# Patient Record
Sex: FEMALE | Race: WHITE | Employment: FULL TIME | URBAN - NONMETROPOLITAN AREA
[De-identification: names, ages, dates, MRNs, and addresses within clinical notes are randomized per-mention and may not be internally consistent; named-entity substitution may affect disease eponyms.]

---

## 2021-05-05 ENCOUNTER — TELEPHONE (OUTPATIENT)
Dept: OBGYN CLINIC | Age: 46
End: 2021-05-05

## 2021-05-05 NOTE — TELEPHONE ENCOUNTER
Pt called for refill on RX- sched annual on 8/18 wanted only Dr Rolo Kruse. Could we send refills for her until that date please.

## 2021-05-06 RX ORDER — NORGESTIMATE AND ETHINYL ESTRADIOL 0.25-0.035
1 KIT ORAL DAILY
Qty: 1 PACKET | Refills: 2 | Status: SHIPPED | OUTPATIENT
Start: 2021-05-06 | End: 2021-08-25

## 2021-08-24 ENCOUNTER — TELEPHONE (OUTPATIENT)
Dept: OBGYN CLINIC | Age: 46
End: 2021-08-24

## 2021-08-25 RX ORDER — NORGESTIMATE AND ETHINYL ESTRADIOL 0.25-0.035
1 KIT ORAL DAILY
Qty: 1 PACKET | Refills: 0 | Status: SHIPPED | OUTPATIENT
Start: 2021-08-25 | End: 2021-09-21

## 2021-09-16 ENCOUNTER — OFFICE VISIT (OUTPATIENT)
Dept: OBGYN CLINIC | Age: 46
End: 2021-09-16
Payer: COMMERCIAL

## 2021-09-16 VITALS
BODY MASS INDEX: 28.66 KG/M2 | DIASTOLIC BLOOD PRESSURE: 73 MMHG | WEIGHT: 172 LBS | HEART RATE: 68 BPM | SYSTOLIC BLOOD PRESSURE: 105 MMHG | HEIGHT: 65 IN

## 2021-09-16 DIAGNOSIS — Z01.419 WOMEN'S ANNUAL ROUTINE GYNECOLOGICAL EXAMINATION: Primary | ICD-10-CM

## 2021-09-16 PROCEDURE — 99396 PREV VISIT EST AGE 40-64: CPT | Performed by: OBSTETRICS & GYNECOLOGY

## 2021-09-16 RX ORDER — LISINOPRIL 40 MG/1
TABLET ORAL
COMMUNITY
Start: 2021-09-15

## 2021-09-16 RX ORDER — AMLODIPINE BESYLATE 10 MG/1
TABLET ORAL
COMMUNITY
Start: 2021-09-07

## 2021-09-16 ASSESSMENT — ENCOUNTER SYMPTOMS
CONSTIPATION: 0
SHORTNESS OF BREATH: 0
DIARRHEA: 0
ABDOMINAL PAIN: 0

## 2021-09-16 NOTE — PROGRESS NOTES
YEARLY PHYSICAL    Date of service: 2021    Abdullahi Acevedo  Is a 51 y.o.   female    PT's PCP is: Donaldo Van MD     : 1975                                             Subjective:       No LMP recorded. Patient has had a hysterectomy. Are your menses regular: not applicable    OB History    Para Term  AB Living   2 2           SAB TAB Ectopic Molar Multiple Live Births                    # Outcome Date GA Lbr Mick/2nd Weight Sex Delivery Anes PTL Lv   2 Para            1 Para                 Social History     Tobacco Use   Smoking Status Former Smoker    Start date:     Quit date: 2019    Years since quittin.7        Social History     Substance and Sexual Activity   Alcohol Use Yes       Family History   Problem Relation Age of Onset    Diabetes Maternal Grandfather     Cancer Maternal Grandfather     Hypertension Maternal Uncle        Any family history of breast or ovarian cancer: No    Any family history of blood clots: No    Allergies: Patient has no known allergies. Current Outpatient Medications:     lisinopril (PRINIVIL;ZESTRIL) 40 MG tablet, , Disp: , Rfl:     amLODIPine (NORVASC) 10 MG tablet, take 1 tablet by mouth once daily, Disp: , Rfl:     Omega-3 Fatty Acids (FISH OIL PO), Take 1 tablet by mouth daily, Disp: , Rfl:     Calcium Carb-Cholecalciferol (CALCIUM/VITAMIN D PO), Take 1 tablet by mouth daily, Disp: , Rfl:     norgestimate-ethinyl estradiol (ORTHO-CYCLEN, 28,) 0.25-35 MG-MCG per tablet, Take 1 tablet by mouth daily, Disp: 1 packet, Rfl: 0    Social History     Substance and Sexual Activity   Sexual Activity Not on file       Any bleeding or pain with intercourse: No    Last Yearly:      Last Mammogram: Pt.  Declines mammogram.    Do you do self breast exams: No    Past Medical History:   Diagnosis Date    Hypercholesteremia     Hypertension        Past Surgical History:   Procedure Laterality Date     SECTION  1997    HYSTERECTOMY, VAGINAL  2000    LAPAROSCOPY  2019    diagnostic lap with Lysis of adhesions    OVARIAN CYST REMOVAL      OVARIAN CYST REMOVAL Left 2019    SALPINGECTOMY  2019    full right salpingectomy, partial left salpingectomy    TONSILLECTOMY         Family History   Problem Relation Age of Onset    Diabetes Maternal Grandfather     Cancer Maternal Grandfather     Hypertension Maternal Uncle        Chief Complaint   Patient presents with    Annual Exam     Pt. is currently watching her diet closely. Her chol. is high and she is waiting for her cardiologist to get back to her on treating that. PE: Vital Signs  Blood pressure 105/73, pulse 68, height 5' 5\" (1.651 m), weight 172 lb (78 kg). Estimated body mass index is 28.62 kg/m² as calculated from the following:    Height as of this encounter: 5' 5\" (1.651 m). Weight as of this encounter: 172 lb (78 kg). Labs:    No results found for this visit on 21. No data recorded    NURSE: Mary Brown    HPI: here for annual exam    Review of Systems   Constitutional: Negative for chills, fatigue and fever. Respiratory: Negative for shortness of breath. Cardiovascular: Negative for chest pain. Gastrointestinal: Negative for abdominal pain, constipation and diarrhea. Genitourinary: Negative for dysuria, enuresis, frequency, menstrual problem, pelvic pain, urgency and vaginal bleeding. Neurological: Negative for dizziness, light-headedness and headaches. Objective  Physical Exam  Constitutional:       Appearance: Normal appearance. Genitourinary:      Pelvic exam was performed with patient in the lithotomy position. Vulva, vagina, right adnexa and left adnexa normal.      Cervix is absent. Uterus is absent. HENT:      Head: Atraumatic.       Mouth/Throat:      Mouth: Mucous membranes are moist.   Eyes: Extraocular Movements: Extraocular movements intact. Pupils: Pupils are equal, round, and reactive to light. Cardiovascular:      Rate and Rhythm: Normal rate. Pulmonary:      Effort: Pulmonary effort is normal.   Chest:      Breasts:         Right: Normal.         Left: Normal.   Abdominal:      General: There is no distension. Palpations: Abdomen is soft. Tenderness: There is no abdominal tenderness. Musculoskeletal:         General: Normal range of motion. Cervical back: Normal range of motion. Neurological:      Mental Status: She is alert and oriented to person, place, and time. Skin:     General: Skin is warm and dry. Psychiatric:         Mood and Affect: Mood normal.         Behavior: Behavior normal.         Thought Content: Thought content normal.         Judgment: Judgment normal.   Chaperone present: chaperone declined. Assessment and Plan          Diagnosis Orders   1. Women's annual routine gynecological examination               I am having Estela Hearn maintain her norgestimate-ethinyl estradiol, lisinopril, amLODIPine, Omega-3 Fatty Acids (FISH OIL PO), and Calcium Carb-Cholecalciferol (CALCIUM/VITAMIN D PO). Return in about 1 year (around 9/16/2022) for annual.    She was also counseled on her preventative health maintenance recommendations and follow-up. There are no Patient Instructions on file for this visit.     Medardo Ruffin DO,9/16/2021 3:23 PM

## 2021-09-21 RX ORDER — NORGESTIMATE AND ETHINYL ESTRADIOL 0.25-0.035
KIT ORAL
Qty: 28 TABLET | Refills: 12 | Status: SHIPPED | OUTPATIENT
Start: 2021-09-21 | End: 2022-08-22

## 2021-11-04 ENCOUNTER — TELEPHONE (OUTPATIENT)
Dept: OBGYN CLINIC | Age: 46
End: 2021-11-04

## 2021-11-04 RX ORDER — ACETAMINOPHEN AND CODEINE PHOSPHATE 120; 12 MG/5ML; MG/5ML
1 SOLUTION ORAL DAILY
Qty: 30 TABLET | Refills: 10 | Status: SHIPPED | OUTPATIENT
Start: 2021-11-04 | End: 2022-08-22

## 2021-11-04 NOTE — TELEPHONE ENCOUNTER
Spoke to patient and reviewed Dr. Kaur Pinzon recommendations. Patient would like to switch to a progesterone only pill and will try the whitening cream.  She is aware that she needs to finish her current pack of Mono-Linyah and switch to the new medication at that time. Patient aware that she needs to give it a few weeks to notice a difference. She will call back if not noticing improvement. Patient verbalized understanding, no further questions/concerns voiced.

## 2021-11-04 NOTE — TELEPHONE ENCOUNTER
Yes ocp's can worsen melasma - have her get a whitening cream to attempt to lessen and consider IUD or progesterone only ocp instead

## 2021-11-04 NOTE — TELEPHONE ENCOUNTER
Patient called. She reports that she has brown discoloration on her face. It is on her nose and entire cheek bone is covered. She reports that she believes she has been on birth control for about 3 years. She notices a spot before that but it has worsened and grown bigger. She wonders if her ocp is contributing to this and if she should stop taking it. What is your opinion?

## 2022-08-22 RX ORDER — ACETAMINOPHEN AND CODEINE PHOSPHATE 120; 12 MG/5ML; MG/5ML
SOLUTION ORAL
Qty: 28 TABLET | Refills: 0 | Status: SHIPPED | OUTPATIENT
Start: 2022-08-22 | End: 2022-09-22

## 2022-09-22 ENCOUNTER — OFFICE VISIT (OUTPATIENT)
Dept: OBGYN CLINIC | Age: 47
End: 2022-09-22
Payer: COMMERCIAL

## 2022-09-22 VITALS
DIASTOLIC BLOOD PRESSURE: 80 MMHG | BODY MASS INDEX: 25.88 KG/M2 | SYSTOLIC BLOOD PRESSURE: 110 MMHG | HEIGHT: 66 IN | WEIGHT: 161 LBS

## 2022-09-22 DIAGNOSIS — Z01.419 WOMEN'S ANNUAL ROUTINE GYNECOLOGICAL EXAMINATION: Primary | ICD-10-CM

## 2022-09-22 DIAGNOSIS — Z87.440 HISTORY OF ACUTE CYSTITIS: ICD-10-CM

## 2022-09-22 LAB
BILIRUBIN, POC: NORMAL
BLOOD URINE, POC: NORMAL
CLARITY, POC: NORMAL
COLOR, POC: NORMAL
GLUCOSE URINE, POC: NORMAL
KETONES, POC: NORMAL
LEUKOCYTE EST, POC: NORMAL
NITRITE, POC: NORMAL
PH, POC: 7
PROTEIN, POC: NORMAL
SPECIFIC GRAVITY, POC: 1.03
UROBILINOGEN, POC: 0.2

## 2022-09-22 PROCEDURE — 99396 PREV VISIT EST AGE 40-64: CPT | Performed by: OBSTETRICS & GYNECOLOGY

## 2022-09-22 PROCEDURE — 81002 URINALYSIS NONAUTO W/O SCOPE: CPT | Performed by: OBSTETRICS & GYNECOLOGY

## 2022-09-22 RX ORDER — NORETHINDRONE 0.35 MG/1
TABLET ORAL
Qty: 28 TABLET | Refills: 11 | Status: SHIPPED | OUTPATIENT
Start: 2022-09-22

## 2022-09-22 NOTE — PROGRESS NOTES
YEARLY PHYSICAL    Date of service: 2022    Adela Bailon  Is a 49 y.o.   female    PT's PCP is: Angela Romero MD     : 1975                                         Chaperone for Intimate Exam  Chaperone was offered as part of the rooming process. Patient declined and agrees to continue with exam without a chaperone. Chaperone: na      Subjective:       No LMP recorded. Patient has had a hysterectomy. Are your menses regular: not applicable    OB History    Para Term  AB Living   2 2           SAB IAB Ectopic Molar Multiple Live Births                    # Outcome Date GA Lbr Mick/2nd Weight Sex Delivery Anes PTL Lv   2 Para            1 Para                 Social History     Tobacco Use   Smoking Status Former    Types: Cigarettes    Start date: 200    Quit date:     Years since quitting: 3.7   Smokeless Tobacco Never        Social History     Substance and Sexual Activity   Alcohol Use Yes       Family History   Problem Relation Age of Onset    Diabetes Maternal Grandfather     Cancer Maternal Grandfather     Hypertension Maternal Uncle        Any family history of breast or ovarian cancer: No    Any family history of blood clots: No      Allergies: Patient has no known allergies.       Current Outpatient Medications:     Turmeric (QC TUMERIC COMPLEX PO), Take by mouth, Disp: , Rfl:     NONFORMULARY, Med Name: Raw Honey, Disp: , Rfl:     MULTIPLE VITAMIN PO, Take by mouth, Disp: , Rfl:     lisinopril (PRINIVIL;ZESTRIL) 40 MG tablet, , Disp: , Rfl:     amLODIPine (NORVASC) 10 MG tablet, take 1 tablet by mouth once daily, Disp: , Rfl:     Omega-3 Fatty Acids (FISH OIL PO), Take 1 tablet by mouth daily, Disp: , Rfl:     Calcium Carb-Cholecalciferol (CALCIUM/VITAMIN D PO), Take 1 tablet by mouth daily, Disp: , Rfl:     norethindrone (JENCYCLA) 0.35 MG tablet, take 1 tablet by mouth once daily, Disp: 28 tablet, Rfl: 11    Social History     Substance and Sexual Activity   Sexual Activity Yes    Partners: Male       Any bleeding or pain with intercourse: Yes    Last Yearly:  9-    Last pap: s/p TLH in 2000    Last Mammogram: Declined in past    Do you do self breast exams: No    Past Medical History:   Diagnosis Date    Chronic back pain     spine is bone on bone, no disc is left    Hypercholesteremia     Hypertension        Past Surgical History:   Procedure Laterality Date    [address], VAGINAL  2000    LAPAROSCOPY  01/31/2019    diagnostic lap with Lysis of adhesions    OVARIAN CYST REMOVAL  2019    OVARIAN CYST REMOVAL Left 01/31/2019    SALPINGECTOMY  01/31/2019    full right salpingectomy, partial left salpingectomy    TONSILLECTOMY  1976       Family History   Problem Relation Age of Onset    Diabetes Maternal Grandfather     Cancer Maternal Grandfather     Hypertension Maternal Uncle        Chief Complaint   Patient presents with    Annual Exam     Pt. Reports that she had a UTI earlier in the month that she was able to get rid of on her own at home. She requests urine stick to be sure it is gone. Her  wants her to have rechecked. Dyspareunia     Pt. Reports that she had very painful intercourse back in August.  She could feel it with penetration like something was being hit. She has not had reoccur ance of this pain since that time. Pt. Rachael Pod is she has ovarian cyst.  She has had hysterectomy but is on OCP to prevent ovarian cysts. PE:  Vital Signs  Blood pressure 110/80, height 5' 5.5\" (1.664 m), weight 161 lb (73 kg). Estimated body mass index is 26.38 kg/m² as calculated from the following:    Height as of this encounter: 5' 5.5\" (1.664 m). Weight as of this encounter: 161 lb (73 kg). Labs:    No results found for this visit on 09/22/22.     No data recorded    NURSE: Merlin Adolph    HPI: here for annual - wants to make sure uti is gone    Review of Systems   Constitutional:  Negative for chills and fever. Genitourinary:  Negative for dysuria, menstrual problem, urgency and vaginal discharge. Physical Exam  Constitutional:       Appearance: Normal appearance. Genitourinary:      Vulva, bladder and urethral meatus normal.      Right Labia: No rash or lesions. Left Labia: No lesions or rash. No labial fusion noted. No vaginal discharge. No vaginal prolapse present. No vaginal atrophy present. Right Adnexa: not tender and no mass present. Left Adnexa: not tender and no mass present. Cervix is absent. No parametrium nodularity present. Uterus is absent. Breasts:     Breasts are soft. Right: No inverted nipple, mass, nipple discharge, skin change or tenderness. Left: No inverted nipple, mass, nipple discharge, skin change or tenderness. HENT:      Head: Normocephalic and atraumatic. Eyes:      Extraocular Movements: Extraocular movements intact. Pupils: Pupils are equal, round, and reactive to light. Cardiovascular:      Rate and Rhythm: Normal rate. Pulmonary:      Effort: Pulmonary effort is normal.   Abdominal:      General: There is no distension. Palpations: Abdomen is soft. There is no mass. Tenderness: There is no abdominal tenderness. Musculoskeletal:         General: Normal range of motion. Cervical back: Normal range of motion. Neurological:      General: No focal deficit present. Mental Status: She is alert and oriented to person, place, and time. Skin:     General: Skin is warm and dry. Psychiatric:         Mood and Affect: Mood normal.         Behavior: Behavior normal.         Thought Content: Thought content normal.         Judgment: Judgment normal.                                 Assessment and Plan          Diagnosis Orders   1. Women's annual routine gynecological examination        2.  History of acute cystitis  POCT Urinalysis Dipstick (No Micro)          Repeat Annual every 1 year  Cervical Cytology Evaluation begins at 24years old. If Negative Cytology, Follow-up screening per current guidelines. Mammograms every 1year. If 35 yo and last mammogram was negative. Routine healthmaintenance per patients PCP. I am having Rich Spurling maintain her lisinopril, amLODIPine, Omega-3 Fatty Acids (FISH OIL PO), Calcium Carb-Cholecalciferol (CALCIUM/VITAMIN D PO), Turmeric (QC TUMERIC COMPLEX PO), NONFORMULARY, and MULTIPLE VITAMIN PO. Return in about 1 year (around 9/22/2023) for annual.    She was also counseled on her preventative health maintenance recommendations and follow-up. There are no Patient Instructions on file for this visit.     NASIM Kapoor,5/04/7440 3:48 PM

## 2023-09-28 RX ORDER — NORETHINDRONE 0.35 MG/1
TABLET ORAL
Qty: 28 TABLET | Refills: 0 | Status: SHIPPED | OUTPATIENT
Start: 2023-09-28

## 2023-10-24 ENCOUNTER — OFFICE VISIT (OUTPATIENT)
Dept: OBGYN CLINIC | Age: 48
End: 2023-10-24
Payer: COMMERCIAL

## 2023-10-24 VITALS
HEIGHT: 66 IN | BODY MASS INDEX: 26.23 KG/M2 | SYSTOLIC BLOOD PRESSURE: 114 MMHG | DIASTOLIC BLOOD PRESSURE: 70 MMHG | WEIGHT: 163.2 LBS

## 2023-10-24 DIAGNOSIS — Z12.31 ENCOUNTER FOR SCREENING MAMMOGRAM FOR MALIGNANT NEOPLASM OF BREAST: ICD-10-CM

## 2023-10-24 DIAGNOSIS — L81.1 MELASMA: ICD-10-CM

## 2023-10-24 DIAGNOSIS — Z01.419 WOMEN'S ANNUAL ROUTINE GYNECOLOGICAL EXAMINATION: Primary | ICD-10-CM

## 2023-10-24 PROCEDURE — 99396 PREV VISIT EST AGE 40-64: CPT | Performed by: NURSE PRACTITIONER

## 2023-10-24 ASSESSMENT — ENCOUNTER SYMPTOMS
CONSTIPATION: 0
DIARRHEA: 0
SHORTNESS OF BREATH: 0
ABDOMINAL PAIN: 0
CHEST TIGHTNESS: 0

## 2023-10-24 NOTE — PROGRESS NOTES
YEARLY PHYSICAL    Date of service: 10/24/2023    Sandra Quinn  Is a 51 y.o. female    PT's PCP is: Fredo Alcaraz MD     : 1975                                         Chaperone for Intimate Exam  Chaperone was offered as part of the rooming process. Patient declined and agrees to continue with exam without a chaperone. Chaperone: n/a      Subjective:       No LMP recorded. Patient has had a hysterectomy. Are your menses regular: not applicable    OB History    Para Term  AB Living   2 2           SAB IAB Ectopic Molar Multiple Live Births                    # Outcome Date GA Lbr Mick/2nd Weight Sex Delivery Anes PTL Lv   2 Para            1 Para                 Social History     Tobacco Use   Smoking Status Former    Types: Cigarettes    Start date: 200    Quit date:     Years since quittin.8   Smokeless Tobacco Never        Social History     Substance and Sexual Activity   Alcohol Use Yes       Family History   Problem Relation Age of Onset    Diabetes Maternal Grandfather     Cancer Maternal Grandfather     Pancreatic Cancer Maternal Grandfather     Hypertension Maternal Uncle        Any family history of breast or ovarian cancer: No    Any family history of blood clots: No      Allergies: Patient has no known allergies.       Current Outpatient Medications:     ELDERBERRY PO, Take by mouth daily, Disp: , Rfl:     JENCYCLA 0.35 MG tablet, take 1 tablet by mouth once daily, Disp: 28 tablet, Rfl: 0    Turmeric (QC TUMERIC COMPLEX PO), Take by mouth, Disp: , Rfl:     NONFORMULARY, Med Name: Raw Honey, Disp: , Rfl:     MULTIPLE VITAMIN PO, Take by mouth, Disp: , Rfl:     lisinopril (PRINIVIL;ZESTRIL) 40 MG tablet, , Disp: , Rfl:     amLODIPine (NORVASC) 10 MG tablet, take 1 tablet by mouth once daily, Disp: , Rfl:     Calcium Carb-Cholecalciferol (CALCIUM/VITAMIN D PO), Take 1 tablet by mouth daily, Disp:

## 2023-10-29 ENCOUNTER — TELEPHONE (OUTPATIENT)
Dept: OBGYN CLINIC | Age: 48
End: 2023-10-29

## 2023-10-29 NOTE — TELEPHONE ENCOUNTER
Please send referral for screening colonoscopy to Dr. Leslie Pearson at [facility] for screening colonoscopy. Mammogram order needs faxed there as well.

## 2023-10-30 DIAGNOSIS — Z12.11 SCREENING FOR COLON CANCER: Primary | ICD-10-CM

## 2023-10-30 RX ORDER — NORETHINDRONE 0.35 MG/1
TABLET ORAL
Qty: 28 TABLET | Refills: 11 | Status: SHIPPED | OUTPATIENT
Start: 2023-10-30

## 2023-11-16 DIAGNOSIS — Z12.31 ENCOUNTER FOR SCREENING MAMMOGRAM FOR MALIGNANT NEOPLASM OF BREAST: ICD-10-CM

## 2024-08-06 ENCOUNTER — TELEPHONE (OUTPATIENT)
Dept: OBGYN CLINIC | Age: 49
End: 2024-08-06

## 2024-08-06 NOTE — TELEPHONE ENCOUNTER
Patient called stating that she is on Jencycla and has started noticing hot flashes and night sweats.  She had a partial hysterectomy, so not having cycles.  When questioning about any other symptoms, she is also not able to lose weight, but denies any mood changes.  She is questioning if she could take black cohosh with her Jencycla or if there is something else you would recommend?

## 2024-08-07 NOTE — TELEPHONE ENCOUNTER
I attempted to return patient's call and had to leave a message.  Reviewed Dami's recommendations.  Explained that if she wanted hormones checked, she needs to be off her POP for at least 30 days, she would need to finish her pack (not to stop in the middle) and we can get labs at her annual.  Patient to call with any further questions/concerns.

## 2024-10-29 ENCOUNTER — OFFICE VISIT (OUTPATIENT)
Dept: OBGYN CLINIC | Age: 49
End: 2024-10-29
Payer: COMMERCIAL

## 2024-10-29 ENCOUNTER — HOSPITAL ENCOUNTER (OUTPATIENT)
Age: 49
Setting detail: SPECIMEN
Discharge: HOME | End: 2024-10-29

## 2024-10-29 VITALS
DIASTOLIC BLOOD PRESSURE: 64 MMHG | WEIGHT: 167 LBS | HEIGHT: 66 IN | SYSTOLIC BLOOD PRESSURE: 100 MMHG | BODY MASS INDEX: 26.84 KG/M2

## 2024-10-29 DIAGNOSIS — R68.82 LOW LIBIDO: ICD-10-CM

## 2024-10-29 DIAGNOSIS — N95.1 MENOPAUSAL SYMPTOMS: ICD-10-CM

## 2024-10-29 DIAGNOSIS — Z01.419 WOMEN'S ANNUAL ROUTINE GYNECOLOGICAL EXAMINATION: Primary | ICD-10-CM

## 2024-10-29 DIAGNOSIS — R10.2 PELVIC PRESSURE IN FEMALE: ICD-10-CM

## 2024-10-29 LAB
BILIRUBIN, POC: ABNORMAL
BLOOD URINE, POC: ABNORMAL
CLARITY, POC: CLEAR
COLOR, POC: ABNORMAL
ESTRADIOL LEVEL: <5 PG/ML
FSH SERPL-ACNC: 103 MIU/ML
GLUCOSE URINE, POC: ABNORMAL MG/DL
KETONES, POC: ABNORMAL MG/DL
LEUKOCYTE EST, POC: ABNORMAL
NITRITE, POC: POSITIVE
PH, POC: 5.5
PROTEIN, POC: ABNORMAL MG/DL
SPECIFIC GRAVITY, POC: 1.03
UROBILINOGEN, POC: ABNORMAL MG/DL

## 2024-10-29 PROCEDURE — 99396 PREV VISIT EST AGE 40-64: CPT | Performed by: NURSE PRACTITIONER

## 2024-10-29 PROCEDURE — 81003 URINALYSIS AUTO W/O SCOPE: CPT | Performed by: NURSE PRACTITIONER

## 2024-10-29 PROCEDURE — 99214 OFFICE O/P EST MOD 30 MIN: CPT | Performed by: NURSE PRACTITIONER

## 2024-10-29 RX ORDER — SULFAMETHOXAZOLE AND TRIMETHOPRIM 800; 160 MG/1; MG/1
1 TABLET ORAL 2 TIMES DAILY
Qty: 6 TABLET | Refills: 0 | Status: SHIPPED | OUTPATIENT
Start: 2024-10-29 | End: 2024-11-01

## 2024-10-29 NOTE — PROGRESS NOTES
YEARLY PHYSICAL    Date of service: 10/29/2024    Holly Ortiz  Is a 49 y.o. female    PT's PCP is: Dustin Flynn MD     : 1975                                         Chaperone for Intimate Exam  Chaperone was offered as part of the rooming process. Patient declined and agrees to continue with exam without a chaperone.  Chaperone: n/a      Subjective:       No LMP recorded. Patient has had a hysterectomy.     Are your menses regular: not applicable    OB History    Para Term  AB Living   2 2           SAB IAB Ectopic Molar Multiple Live Births                    # Outcome Date GA Lbr Mick/2nd Weight Sex Type Anes PTL Lv   2 Para            1 Para                 Social History     Tobacco Use   Smoking Status Former    Current packs/day: 0.00    Types: Cigarettes    Start date:     Quit date:     Years since quittin.8   Smokeless Tobacco Never        Social History     Substance and Sexual Activity   Alcohol Use Yes    Comment: rare       Family History   Problem Relation Age of Onset    Diabetes Maternal Grandfather     Cancer Maternal Grandfather     Pancreatic Cancer Maternal Grandfather     Cancer Maternal Uncle     Hypertension Maternal Uncle     Heart Disease Maternal Aunt        Any family history of breast or ovarian cancer: No    Any family history of blood clots: No      Allergies: Patient has no known allergies.      Current Outpatient Medications:     Turmeric (QC TUMERIC COMPLEX PO), Take by mouth, Disp: , Rfl:     NONFORMULARY, Med Name: Raw Honey, Disp: , Rfl:     lisinopril (PRINIVIL;ZESTRIL) 40 MG tablet, , Disp: , Rfl:     amLODIPine (NORVASC) 10 MG tablet, take 1 tablet by mouth once daily, Disp: , Rfl:     Omega-3 Fatty Acids (FISH OIL PO), Take 1 tablet by mouth daily, Disp: , Rfl:     Calcium Carb-Cholecalciferol (CALCIUM/VITAMIN D PO), Take 1 tablet by mouth daily, Disp: , Rfl:

## 2024-10-31 LAB
MICROORGANISM SPEC CULT: ABNORMAL
SERVICE CMNT-IMP: ABNORMAL
SPECIMEN DESCRIPTION: ABNORMAL

## 2024-11-12 ASSESSMENT — ENCOUNTER SYMPTOMS
DIARRHEA: 0
SHORTNESS OF BREATH: 0
ABDOMINAL PAIN: 0
CONSTIPATION: 0

## 2025-04-28 ENCOUNTER — TELEPHONE (OUTPATIENT)
Dept: OBGYN CLINIC | Age: 50
End: 2025-04-28

## 2025-04-28 NOTE — TELEPHONE ENCOUNTER
Pt called c/o UTI symptoms for past 1-2 weeks. Has tried cranberry juice. Pt requested a script called in to [facility]. Advised pt we would need to see her before we can prescribe anything. Pt unable to come in tomorrow and all visits for today are full. Recommenced pt go to Urgent Care or call PCP. Pt voiced understanding.